# Patient Record
Sex: MALE | Race: OTHER | Employment: PART TIME | URBAN - NONMETROPOLITAN AREA
[De-identification: names, ages, dates, MRNs, and addresses within clinical notes are randomized per-mention and may not be internally consistent; named-entity substitution may affect disease eponyms.]

---

## 2024-04-22 ENCOUNTER — TELEPHONE (OUTPATIENT)
Dept: FAMILY MEDICINE CLINIC | Facility: CLINIC | Age: 54
End: 2024-04-22

## 2024-04-22 NOTE — TELEPHONE ENCOUNTER
# [id number] assisted in the call with patient.  I relayed the message to patient about getting his blood work completed before Doctor can refill medication.  Patient stated that he will be in tomorrow morning to get blood work done. I stated to stop into the office and we can send message over to refill medication.

## 2024-04-30 ENCOUNTER — TELEPHONE (OUTPATIENT)
Dept: FAMILY MEDICINE CLINIC | Facility: CLINIC | Age: 54
End: 2024-04-30

## 2024-04-30 NOTE — TELEPHONE ENCOUNTER
----- Message from Hortencia Zarate MD sent at 4/25/2024  4:50 PM EDT -----  Hi Ladies,    Can we please call patient and inform him of the below:    After review of your lab work, overall things look stable. Blood counts, kidney, liver and thyroid function are normal. Cholesterol levels were stable.  Hemoglobin A1c at 8.6 indicating poor control of his diabetes at this time. Lastly, please note that there is some protein in his urine at this time, also indicating we could better control his Type 2 DM. Please let me know if you have any questions or concerns.    Best,    ____________________________________________________________________

## 2024-04-30 NOTE — TELEPHONE ENCOUNTER
[id number]:   Called patient to relay test results below. Patient is aware and will keep his appointment on 5/8/24.

## 2024-05-08 ENCOUNTER — OFFICE VISIT (OUTPATIENT)
Dept: FAMILY MEDICINE CLINIC | Facility: CLINIC | Age: 54
End: 2024-05-08
Payer: COMMERCIAL

## 2024-05-08 VITALS
WEIGHT: 171.6 LBS | TEMPERATURE: 95.8 F | DIASTOLIC BLOOD PRESSURE: 62 MMHG | OXYGEN SATURATION: 95 % | BODY MASS INDEX: 27.58 KG/M2 | HEART RATE: 83 BPM | HEIGHT: 66 IN | SYSTOLIC BLOOD PRESSURE: 110 MMHG

## 2024-05-08 DIAGNOSIS — L80 VITILIGO: ICD-10-CM

## 2024-05-08 DIAGNOSIS — Z00.00 ANNUAL PHYSICAL EXAM: Primary | ICD-10-CM

## 2024-05-08 DIAGNOSIS — Z12.5 SCREENING FOR PROSTATE CANCER: ICD-10-CM

## 2024-05-08 DIAGNOSIS — Z21 ASYMPTOMATIC HIV INFECTION (HCC): ICD-10-CM

## 2024-05-08 DIAGNOSIS — E11.9 TYPE 2 DIABETES MELLITUS WITHOUT COMPLICATION, WITHOUT LONG-TERM CURRENT USE OF INSULIN (HCC): ICD-10-CM

## 2024-05-08 DIAGNOSIS — E78.2 MIXED HYPERLIPIDEMIA: ICD-10-CM

## 2024-05-08 PROCEDURE — 99396 PREV VISIT EST AGE 40-64: CPT | Performed by: FAMILY MEDICINE

## 2024-05-08 RX ORDER — DAPAGLIFLOZIN 5 MG/1
5 TABLET, FILM COATED ORAL DAILY
Qty: 30 TABLET | Refills: 5 | Status: SHIPPED | OUTPATIENT
Start: 2024-05-08 | End: 2024-05-14 | Stop reason: CLARIF

## 2024-05-08 RX ORDER — ATORVASTATIN CALCIUM 20 MG/1
20 TABLET, FILM COATED ORAL DAILY
Qty: 90 TABLET | Refills: 1 | Status: SHIPPED | OUTPATIENT
Start: 2024-05-08

## 2024-05-08 NOTE — ASSESSMENT & PLAN NOTE
Patient currently follows with Infectious Disease (Dr. Conway) for HIV and is maintained on Juluca daily.  - continue f/u with ID for medications and HIV lab works, including Hep C

## 2024-05-08 NOTE — PROGRESS NOTES
ADULT ANNUAL PHYSICAL  [facility] PRIMARY CARE    NAME: Cuauhtemoc Bennett  AGE: 54 y.o. SEX: male  : 1970     DATE: 2024     Assessment and Plan:     Problem List Items Addressed This Visit       HLD (hyperlipidemia)     Last lipid profile on 2024 wnl  - continue lipitor 20mg daily  - encourage resuming exercises         Relevant Medications    atorvastatin (LIPITOR) 20 mg tablet    Vitiligo     Patient f/u with dermatologist in [place] and is currently using Tacrolimus 1% cream, sunscreen, bergamota 10% essential oil for the skin but feels like he's getting more white pigmentations  - referral to derm provided         Relevant Orders    Ambulatory Referral to Dermatology    Asymptomatic HIV infection (HCC)     Patient currently follows with Infectious Disease (Dr. Conway) for HIV and is maintained on Juluca daily.  - continue f/u with ID for medications and HIV lab works, including Hep C         Type 2 diabetes mellitus without complication, without long-term current use of insulin ([facility])       Lab Results   Component Value Date    HGBA1C 8.6 (H) 2024     Patient last HgbA1c was many years ago. He reported he stopped exercising a while ago and has been eating diet with high carbs. Currently taking metformin 1000mg daily. Denied hypoglycemia episode, neuropathy.  Recent lab shows mild proteinuria.  - continue metformin 1000mg daily  - add Farxiga 5mg daily  - f/u with HgA1c in 3 mo         Relevant Medications    metFORMIN (GLUCOPHAGE) 1000 MG tablet    dapagliflozin 5 MG TABS     Other Visit Diagnoses       Annual physical exam    -  Primary    no acute complaints, pt needs new derm referral for vitiligo f/u. Completed basic lab works  - ordered PSA   - referral to GI for colonoscopy and Derm for vitiligo given  - refill lipitor, metformin  - prescribed farxiga 5mg for DM2, proteinuria   - performed diabetic foot exam, wnl    Relevant  Orders    Ambulatory Referral to Gastroenterology    Screening for prostate cancer        pt recall last PSA check many years ago, would like to check again  - ordered PSA    Relevant Orders    PSA, Total Screen              Immunizations and preventive care screenings were discussed with patient today. Appropriate education was printed on patient's after visit summary.    Discussed risks and benefits of prostate cancer screening. We discussed the controversial history of PSA screening for prostate cancer in the [place] as well as the risk of over detection and over treatment of prostate cancer by way of PSA screening.  The patient understands that PSA blood testing is an imperfect way to screen for prostate cancer and that elevated PSA levels in the blood may also be caused by infection, inflammation, prostatic trauma or manipulation, urological procedures, or by benign prostatic enlargement.    The role of the digital rectal examination in prostate cancer screening was also discussed and I discussed with him that there is large interobserver variability in the findings of digital rectal examination.    Counseling:  Dental Health: discussed importance of regular tooth brushing, flossing, and dental visits.  Exercise: the importance of regular exercise/physical activity was discussed. Recommend exercise 3-5 times per week for at least 30 minutes.   Diet: discussed with patient the importance of avoiding processed food and sugary drinks in diet. Considering eating more vegetables. Eat white meat instead of red meat. Cutting carbs in diet like bread, rice, noodles.         Return in about 3 months (around 8/8/2024) for f/u A1c.     Chief Complaint:     Chief Complaint   Patient presents with    Physical Exam     Dermatology referral-white spots on skin      History of Present Illness:     Adult Annual Physical   Cuauhtemoc Bennett is a 55 yo male with PMH HLD, DM2, HIV, and vitiligo presents for a comprehensive  physical exam. The patient reports he is getting more vitiligo white pigmentations. He f/u with derm in [facility] for the condition and has been using sunscreen, tacrolimus 1% cream, and bergamota with minimal help. Patient f/u with ID for HIV lab works and is currently taking Juluca daily. He is also taking metformin 1000mg daily and denied new neuropathy or hypoglycemia episodes. Patient is aware his recent lab work shows elevated A1c 8.6 and mild proteinuria. He admitted not exercising enough, and his diet is consists of high carbs. Patient denied headache, sweating, dizziness, N/V, CP, SOB, abdominal pain, dysuria, hematuria, changes with bowel movement, eye pain or discharge.     Diet and Physical Activity  Diet/Nutrition: high carb diet.   Exercise: no formal exercise. Pt stopped exercising a while ago     Depression Screening  PHQ-2/9 Depression Screening    Little interest or pleasure in doing things: 0 - not at all  Feeling down, depressed, or hopeless: 0 - not at all  PHQ-2 Score: 0  PHQ-2 Interpretation: Negative depression screen       General Health  Sleep: gets 7-8 hours of sleep on average.   Hearing: normal - bilateral.  Vision:  wear glasses, will f/u with ophthalmologist to get new prescription .   Dental:  will f/u with dentist back in [facility] in a month .        Health  Symptoms include: none       Review of Systems:     Review of Systems   Constitutional:  Negative for chills and fever.   HENT:  Negative for ear pain, hearing loss, sore throat and tinnitus.    Eyes:  Negative for pain, discharge and visual disturbance.   Respiratory:  Negative for cough, shortness of breath and wheezing.    Cardiovascular:  Negative for chest pain, palpitations and leg swelling.   Gastrointestinal:  Negative for abdominal distention, abdominal pain, blood in stool, constipation, diarrhea, nausea and vomiting.   Genitourinary:  Negative for difficulty urinating, dysuria and hematuria.    Musculoskeletal:  Negative for back pain.   Skin:  Positive for color change. Negative for wound.        Chronic vitiligo white pigmentations on b/l extremities   Neurological:  Negative for dizziness and headaches.   Hematological:  Negative for adenopathy.   All other systems reviewed and are negative.     Past Medical History:     Past Medical History:   Diagnosis Date    Diabetes mellitus (HCC) 2008    HIV disease (HCC)       Past Surgical History:     Past Surgical History:   Procedure Laterality Date    FINGER SURGERY Right 1977      Family History:     Family History   Problem Relation Age of Onset    Diabetes Brother       Social History:     Social History     Socioeconomic History    Marital status: /Civil Union     Spouse name: None    Number of children: None    Years of education: None    Highest education level: None   Occupational History    None   Tobacco Use    Smoking status: Never     Passive exposure: Never    Smokeless tobacco: Never   Vaping Use    Vaping status: Never Used   Substance and Sexual Activity    Alcohol use: Not Currently     Comment: social    Drug use: Never    Sexual activity: None   Other Topics Concern    None   Social History Narrative    None     Social Determinants of Health     Financial Resource Strain: Not on file   Food Insecurity: Not on file   Transportation Needs: Not on file   Physical Activity: Not on file   Stress: Not on file   Social Connections: Not on file   Intimate Partner Violence: Not on file   Housing Stability: Not on file      Current Medications:     Current Outpatient Medications   Medication Sig Dispense Refill    atorvastatin (LIPITOR) 20 mg tablet Take 1 tablet (20 mg total) by mouth daily 90 tablet 1    dapagliflozin 5 MG TABS Take 1 tablet (5 mg total) by mouth daily 30 tablet 5    Juluca 50-25 MG TABS Take 1 tablet by mouth daily      metFORMIN (GLUCOPHAGE) 1000 MG tablet Take 1 tablet (1,000 mg total) by mouth 2 (two) times a day 180  "tablet 1     No current facility-administered medications for this visit.      Allergies:     No Known Allergies   Physical Exam:     /62   Pulse 83   Temp (!) 95.8 °F (35.4 °C)   Ht 5' 5.5\" (1.664 m)   Wt 77.8 kg (171 lb 9.6 oz)   SpO2 95%   BMI 28.12 kg/m²     Physical Exam  Vitals reviewed.   Constitutional:       General: He is not in acute distress.     Appearance: Normal appearance.   HENT:      Head: Normocephalic and atraumatic.      Right Ear: Tympanic membrane, ear canal and external ear normal. There is no impacted cerumen.      Left Ear: Tympanic membrane, ear canal and external ear normal. There is no impacted cerumen.      Nose: Nose normal. No congestion or rhinorrhea.      Mouth/Throat:      Mouth: Mucous membranes are moist.      Pharynx: Oropharynx is clear. No oropharyngeal exudate or posterior oropharyngeal erythema.   Eyes:      General:         Right eye: No discharge.      Extraocular Movements: Extraocular movements intact.      Conjunctiva/sclera: Conjunctivae normal.      Pupils: Pupils are equal, round, and reactive to light.   Cardiovascular:      Rate and Rhythm: Normal rate and regular rhythm.      Pulses: Normal pulses.           Dorsalis pedis pulses are 2+ on the right side and 2+ on the left side.        Posterior tibial pulses are 2+ on the right side and 2+ on the left side.      Heart sounds: Normal heart sounds. No murmur heard.  Pulmonary:      Effort: Pulmonary effort is normal. No respiratory distress.      Breath sounds: Normal breath sounds. No wheezing.   Abdominal:      General: Abdomen is flat. Bowel sounds are normal. There is no distension.      Palpations: Abdomen is soft.      Tenderness: There is no abdominal tenderness. There is no guarding or rebound.   Musculoskeletal:         General: Normal range of motion.      Cervical back: Normal range of motion and neck supple. No rigidity.      Right lower leg: No edema.      Left lower leg: No edema.   Feet: "      Right foot:      Skin integrity: No ulcer, skin breakdown, erythema, warmth, callus or dry skin.      Left foot:      Skin integrity: No ulcer, skin breakdown, erythema, warmth, callus or dry skin.   Skin:     General: Skin is warm.      Capillary Refill: Capillary refill takes less than 2 seconds.      Coloration: Skin is not jaundiced.      Findings: Rash present.      Comments: Multiple maculopapular white pigmentations/patches on b/l extremities   Neurological:      General: No focal deficit present.      Mental Status: He is alert and oriented to person, place, and time.      Motor: No weakness.   Psychiatric:         Mood and Affect: Mood normal.        Patient's shoes and socks removed.    Right Foot/Ankle   Right Foot Inspection  Skin Exam: skin normal and skin intact. No dry skin, no warmth, no callus, no erythema, no maceration, no abnormal color, no pre-ulcer, no ulcer and no callus.     Toe Exam: ROM and strength within normal limits.  no right toe deformity    Sensory   Vibration: intact  Proprioception: intact  Monofilament testing: intact    Vascular  Capillary refills: < 3 seconds  The right DP pulse is 2+. The right PT pulse is 2+.     Left Foot/Ankle  Left Foot Inspection  Skin Exam: skin normal and skin intact. No dry skin, no warmth, no erythema, no maceration, normal color, no pre-ulcer, no ulcer and no callus.     Toe Exam: ROM and strength within normal limits. No left toe deformity.     Sensory   Vibration: intact  Proprioception: intact  Monofilament testing: intact    Vascular  Capillary refills: < 3 seconds  The left DP pulse is 2+. The left PT pulse is 2+.     Assign Risk Category  No deformity present          Dionte Chakraborty DO  [facility] PRIMARY CARE

## 2024-05-08 NOTE — ASSESSMENT & PLAN NOTE
Patient f/u with dermatologist in [facility] and is currently using Tacrolimus 1% cream, sunscreen, bergamota 10% essential oil for the skin but feels like he's getting more white pigmentations  - referral to derm provided

## 2024-05-08 NOTE — ASSESSMENT & PLAN NOTE
Lab Results   Component Value Date    HGBA1C 8.6 (H) 04/23/2024     Patient last HgbA1c was many years ago. He reported he stopped exercising a while ago and has been eating diet with high carbs. Currently taking metformin 1000mg daily. Denied hypoglycemia episode, neuropathy.  Recent lab shows mild proteinuria.  - continue metformin 1000mg daily  - add Farxiga 5mg daily  - f/u with HgA1c in 3 mo

## 2024-05-13 ENCOUNTER — TELEPHONE (OUTPATIENT)
Dept: FAMILY MEDICINE CLINIC | Facility: CLINIC | Age: 54
End: 2024-05-13

## 2024-05-13 DIAGNOSIS — E11.9 TYPE 2 DIABETES MELLITUS WITHOUT COMPLICATION, WITHOUT LONG-TERM CURRENT USE OF INSULIN (HCC): Primary | ICD-10-CM

## 2024-05-13 NOTE — TELEPHONE ENCOUNTER
Called patient's insurance to do a prior auth for Farxiga 5 MG tablet. Patient's insurance will cover brand name not the generic. Also for a 90 day supply as well.     If new prescription could be sent over to [facility].

## 2024-05-14 RX ORDER — DAPAGLIFLOZIN 5 MG/1
5 TABLET, FILM COATED ORAL DAILY
Qty: 90 TABLET | Refills: 1 | Status: SHIPPED | OUTPATIENT
Start: 2024-05-14

## 2024-08-08 ENCOUNTER — TELEPHONE (OUTPATIENT)
Dept: ADMINISTRATIVE | Facility: OTHER | Age: 54
End: 2024-08-08

## 2024-08-08 NOTE — TELEPHONE ENCOUNTER
----- Message from Jahaira PEREZ sent at 8/8/2024  9:13 AM EDT -----  Regarding: Care Gap Request  08/08/24 9:13 AM    Hello, our patient attached above has had Diabetic Foot Exam completed/performed. Please assist in updating the patient chart by pulling the document from visit on 5/8/24 within Chart Review. The date of service is 5/8/24.     Thank you,  Jahaira Crane MA [facility]

## 2024-08-08 NOTE — TELEPHONE ENCOUNTER
Upon review of the In Basket request we have found/obtained the documentation. After careful review of the document we are unable to complete this request for Diabetic Foot Exam because the documentation does not have the proper verbiage (wording) needed to close the requested care gap(s).    Any additional questions or concerns should be emailed to the Practice Liaisons via the appropriate education email address, please do not reply via In Basket.    Thank you  Malinda Card MA   [facility]

## 2024-08-19 ENCOUNTER — TELEPHONE (OUTPATIENT)
Dept: FAMILY MEDICINE CLINIC | Facility: CLINIC | Age: 54
End: 2024-08-19

## 2024-08-19 NOTE — TELEPHONE ENCOUNTER
Please call patient and ensure we reschedule his appt and have him complete required blood work.     Thanks,        *Called pt to relay dr's response. He didn't answer and was unable to leave a voicemail.

## 2024-10-31 DIAGNOSIS — E11.9 TYPE 2 DIABETES MELLITUS WITHOUT COMPLICATION, WITHOUT LONG-TERM CURRENT USE OF INSULIN (HCC): ICD-10-CM

## 2024-10-31 NOTE — TELEPHONE ENCOUNTER
Patient needs updated blood work and has previously placed orders. Please contact patient to go for labs. Courtesy refill provided.     Pt needs A1C

## 2024-11-25 DIAGNOSIS — E78.2 MIXED HYPERLIPIDEMIA: ICD-10-CM

## 2024-11-26 RX ORDER — ATORVASTATIN CALCIUM 20 MG/1
20 TABLET, FILM COATED ORAL DAILY
Qty: 90 TABLET | Refills: 1 | Status: SHIPPED | OUTPATIENT
Start: 2024-11-26

## 2024-12-18 ENCOUNTER — OFFICE VISIT (OUTPATIENT)
Dept: FAMILY MEDICINE CLINIC | Facility: CLINIC | Age: 54
End: 2024-12-18

## 2024-12-18 VITALS
SYSTOLIC BLOOD PRESSURE: 106 MMHG | DIASTOLIC BLOOD PRESSURE: 68 MMHG | HEART RATE: 104 BPM | WEIGHT: 170.2 LBS | TEMPERATURE: 96.9 F | OXYGEN SATURATION: 98 % | BODY MASS INDEX: 27.89 KG/M2

## 2024-12-18 DIAGNOSIS — E11.9 TYPE 2 DIABETES MELLITUS WITHOUT COMPLICATION, WITHOUT LONG-TERM CURRENT USE OF INSULIN (HCC): Primary | ICD-10-CM

## 2024-12-18 DIAGNOSIS — E11.9 TYPE 2 DIABETES MELLITUS WITHOUT COMPLICATION, WITHOUT LONG-TERM CURRENT USE OF INSULIN (HCC): ICD-10-CM

## 2024-12-18 DIAGNOSIS — Z12.11 SCREEN FOR COLON CANCER: ICD-10-CM

## 2024-12-18 LAB — SL AMB POCT HEMOGLOBIN AIC: 8.9 (ref ?–6.5)

## 2024-12-18 RX ORDER — BLOOD-GLUCOSE METER
KIT MISCELLANEOUS
Qty: 1 KIT | Refills: 0 | Status: SHIPPED | OUTPATIENT
Start: 2024-12-18

## 2024-12-18 RX ORDER — BLOOD SUGAR DIAGNOSTIC
STRIP MISCELLANEOUS
Qty: 300 EACH | Refills: 3 | Status: SHIPPED | OUTPATIENT
Start: 2024-12-18

## 2024-12-18 RX ORDER — DAPAGLIFLOZIN 5 MG/1
5 TABLET, FILM COATED ORAL DAILY
Qty: 90 TABLET | Refills: 1 | Status: SHIPPED | OUTPATIENT
Start: 2024-12-18

## 2024-12-18 RX ORDER — LISINOPRIL 5 MG/1
5 TABLET ORAL DAILY
Qty: 90 TABLET | Refills: 1 | Status: SHIPPED | OUTPATIENT
Start: 2024-12-18

## 2024-12-18 RX ORDER — LANCETS 33 GAUGE
EACH MISCELLANEOUS
Qty: 300 EACH | Refills: 3 | Status: SHIPPED | OUTPATIENT
Start: 2024-12-18

## 2024-12-18 NOTE — PATIENT INSTRUCTIONS
Patient Education     Diabetes tipo 2   Conceptos Básicos   Redactado por los médicos y editores de UpToDate   ¿Qué es la diabetes tipo 2? -- La diabetes tipo 2 es un trastorno que afecta la forma en que el cuerpo utiliza el azúcar. A veces se lo llama diabetes mellitus tipo 2.  Todas las células del cuerpo necesitan azúcar para funcionar normalmente. El azúcar entra en las células con la ayuda de martha hormona llamada insulina. La insulina se produce en el páncreas, un órgano ubicado en el área del estómago. Si no hay suficiente insulina o si el cuerpo hallie de responder a la insulina, el azúcar se acumula en la yandel. [place] es lo que les sucede a las personas con diabetes.  Existen dos tipos de diabetes:   Diabetes tipo 1 - En la diabetes tipo 1, el páncreas no produce insulina o produce muy poca.   Diabetes tipo 2 - En la mayoría de los casos de diabetes tipo 2, el cuerpo hallie de responder a la insulina normalmente. Con el tiempo, el páncreas hallie de producir suficiente insulina.  Tener exceso de peso corporal u obesidad aumenta el riesgo de desarrollar diabetes tipo 2. Sin embargo, las personas que no tienen exceso de peso corporal también pueden desarrollar diabetes.  ¿Cuáles son los síntomas de la diabetes tipo 2? -- En general, la diabetes tipo 2 no provoca síntomas. Cuando aparecen síntomas, son los siguientes, entre otros:   Necesidad de orinar con frecuencia   Sed intensa   Visión borrosa  ¿La diabetes puede ocasionar otros problemas de rahul? -- Sí. Es posible que la diabetes tipo 2 no le cause malestar, yonny si no la controla, con el tiempo puede ocasionar problemas graves, por ejemplo:   Infartos   Accidentes cerebrovasculares (derrames)   Enfermedad renal   Problemas de visión (o incluso ceguera)   Dolor o pérdida de sensibilidad en las chinyere y los pies   Necesidad de cortar (amputar) los dedos de las chinyere, los dedos de los pies u otras partes del cuerpo  ¿Cómo puedo saber si tengo diabetes tipo  "2? -- Para averiguar si tiene diabetes tipo 2, el médico o enfermero puede realizar martha prueba de yandel. Existen dos pruebas que pueden usarse con mili fin. Ambas consisten en medir la cantidad de azúcar en la yandel, llamada \"azúcar en yandel\" o \"glucosa en yandel\":   Martha de las pruebas mide el azúcar en yandel en el momento en que se extrae la muestra. Esta prueba se realiza por la mañana. No podrá comer ni beber nada sinan agua tatiana un mínimo de 8 horas antes de la prueba.   La otra prueba indica el promedio de azúcar en yandel de los últimos 2 a 3 meses. Esta prueba de yandel se llama \"hemoglobina A1C\" o simplemente \"A1C\". Se puede revisar en cualquier momento del día, incluso si ha comido recientemente.  ¿Cómo se trata la diabetes tipo 2? -- Las metas del tratamiento son manejar el azúcar en yandel y reducir el riesgo de problemas futuros que pueden desarrollar las personas que tienen diabetes.  El tratamiento podría incluir:   Cambios en el estilo de alexys - Es un aspecto importante del manejo de la diabetes. Incluye comer alimentos saludables y hacer suficiente actividad física.   Medicinas - Existen algunas medicinas que ayudan a disminuir el azúcar en yandel. Algunas personas deben virgil píldoras que permiten que el organismo genere más insulina o que posibilitan que la insulina cumpla con lemus función; otras deben aplicarse inyecciones de insulina.  Según las medicinas que tome, es posible que tenga que revisarse el azúcar en yandel periódicamente en lemus hogar, yonny no todas las personas que tienen diabetes tipo 2 necesitan hacerlo. El médico o enfermero le dirá si debe revisarse el azúcar en yandel, y cuándo y cómo hacerlo.  A veces, las personas con diabetes tipo 2 también deben virgil medicinas para ayudar a prevenir los problemas que causa la enfermedad. Por ejemplo, las medicinas que se usan para bajar la presión arterial pueden disminuir las posibilidades de sufrir un infarto o un accidente " cerebrovascular (derrame).   Atención médica general - También es importante cuidar otros aspectos de lemus rahul. [place] incluye vigilar lemus presión arterial y farhad niveles de colesterol. También debe recibir ciertas vacunas, miguel angel las vacunas para protegerse de la gripe y de la enfermedad por coronavirus 2019 (“COVID-19”). Algunas personas también necesitan martha vacuna para prevenir la neumonía.  ¿La diabetes tipo 2 se puede prevenir? -- Sí. Para reducir farhad posibilidades de desarrollar diabetes tipo 2, lo más importante que puede hacer es tener martha alimentación saludable y realizar mucha actividad física. [place] puede ayudarlo a bajar de peso, si tiene sobrepeso. Sin embargo, comer [place] y hacer actividad también es glover para lemus rahul general. Incluso la actividad leve, miguel angel caminar, tiene beneficios.  Si fuma, dejar de fumar también puede reducir lemus riesgo de desarrollar diabetes tipo 2. Dejar de fumar puede ser difícil, yonny lemus médico o enfermero puede ayudar.  Todos los artículos se actualizan a medida que se descubre nueva evidencia y culmina nuestro proceso de evaluación por homólogos   Angeline artículo se recuperó de UpToDate el: Apr 24, 2024.  Artículo 18132 Versión [id number].[id number]  Release: 32.3.2 - C32.113  © 2024 UpToDate, Inc. Todos los derechos reservados.  Exención de responsabilidad y uso de la información del consumidor   Descargo de responsabilidad: esta información generalizada es un resumen limitado de información sobre el diagnóstico, el tratamiento y/o los medicamentos. No pretende ser exhaustiva y se debe utilizar miguel angel herramienta para ayudar al usuario a comprender y/o evaluar las posibles opciones de diagnóstico y tratamiento. No incluye toda la información sobre afecciones, tratamientos, medicamentos, efectos secundarios o riesgos puedan ser aplicables a un paciente específico. No tiene el propósito de servir miguel angel recomendación médica ni de sustituir la recomendación médica, el diagnóstico o el  "tratamiento de un profesional de atención médica que se base en el examen y la evaluación de mili profesional de la rahul respecto a las circunstancias específicas y únicas del paciente. Los pacientes deben hablar con un profesional de atención médica para obtener información completa sobre lemus rahul, cuestiones médicas y opciones de tratamiento, incluidos los riesgos o los beneficios relacionados con el uso de medicamentos. Esta información no certifica que los tratamientos o medicamentos diana seguros, eficaces o estén aprobados para tratar a un paciente específico. [employer] y farhad afiliados renuncian a cualquier garantía o responsabilidad relacionada con esta información o el uso de la misma.El uso de esta información está sujeto a las Condiciones de uso, disponibles en https://www.BlogCNer.com/en/know/clinical-effectiveness-terms. 2024© [employer]. y farhad afiliados y/o licenciantes. Todos los derechos reservados.  Copyright   © 2024 [employer] Todos los derechos reservados.    Patient Education     Cuidado de los pies para personas con diabetes   Conceptos Básicos   Redactado por los médicos y editores de UpToDate   ¿Por qué el cuidado de los pies es importante si tengo diabetes? -- La diabetes puede producir daño en los nervios si el nivel de azúcar en yandel está alto tatiana mucho tiempo. El término médico es \"neuropatía diabética\".  Si tiene problemas en los nervios en los pies, es posible que no pueda sentir dolor en el pie. Normalmente, las personas sienten dolor cuando tienen un dexter o martha ampolla en un pie. El dolor les indica que deben tratar el dexter para que sane, yonny las personas con daño en los nervios podrían no sentir dolor cuando se lastiman los pies. Incluso es posible que no sepan que tienen un dexter, por lo que podrían dejarlo sin tratar. Los problemas que no se tratan de inmediato pueden empeorar mucho. Por ejemplo, un dexter sin tratar se puede infectar y convertirse en martha " llaga abierta.  El nivel alto de azúcar en yandel también puede dañar los vasos sanguíneos y disminuir el flujo sanguíneo a los pies. [place] puede debilitar la piel y hacer que las heridas tarden más en sanar. También es más probable que se infecte si lemus nivel de azúcar en yandel es alto.  ¿Cómo cuido de mis pies? -- Cuidar [place] de farhad pies puede ayudar a prevenir problemas en los pies. Debe hacer lo siguiente:   Lávese los pies todos los donnie con agua tibia y jabón. Séquese los pies con golpecitos suaves, y asegúrese de secar la piel que está entre los dedos.   Mantenga los pies hidratados. Aplique loción en la parte de arriba y de abajo de los pies, yonny no entre los dedos.   Revísese los pies todos los donnie (figura 1). Dunia si tiene mc, ampollas, enrojecimiento o inflamación. Use un sherin o pídale ayuda a alguien para revisar la parte inferior de los pies. Revise todas las partes del pie, especialmente entre los dedos. Dunia si tiene piel agrietada, úlceras, ampollas o enrojecimiento.   Córtese las uñas del pie rectas cuando sea necesario (figura 2). No dexter las esquinas de las uñas de los pies. Lime los bordes ásperos. No se dexter las cutículas. Pida ayuda si no ve [place] o si no puede inclinarse lo suficiente para revisarse los pies.   Pídale al médico o enfermero que le revise los pies en cada consulta. Quítese los zapatos y los calcetines en esos exámenes.   Consulte a un especialista en cuidado de los pies (miguel angel un podólogo) si tiene martha uña encarnada, un kamar de mendez o un callo. No intente quitarse los ojos de mendez y los callos usted mismo.  ¿Cómo protejo mis pies de lesiones? -- Existen varias maneras de protegerse los pies. Puede hacer lo siguiente:   Use zapatos y calcetines en todo momento, incluso dentro de lemus casa. No camine descalzo. Póngase zapatos de baño si va a la playa o a martha piscina.   Elija calzado que se ajuste [place] a lemus pie. No debe quedarle demasiado suelto ni demasiado apretado. El  calzado debe tener espacio suficiente para los dedos (figura 3). Es posible que lemus médico le recete zapatos especiales. [place] si lemus seguro los cubre.   Revise farhad zapatos cada vez antes de ponérselos para asegurarse de que la plantilla no esté doblada. Además, verifique que no haya nada dentro de los zapatos antes de ponérselos.   No use zapatos que dejen al descubierto ninguna parte del pie, miguel angel sandalias, chanclas o zuecos.   Use calcetines de algodón que no le queden demasiado apretados. No use calzado sin calcetines.   Proteja farhad pies del calor y del frío. Pruebe el agua del baño antes de meter los pies para asegurarse de que no esté demasiado caliente. No camine descalzo sobre suelo caliente. Tenga especial cuidado al salir cuando hace frío y use calcetines abrigados.  ¿Qué más jami saber? -- Puede disminuir el riesgo de tener problemas en los pies si mantiene lemus nivel de azúcar en yandel lo más cerca posible de farhad niveles objetivo. También puede hacer lo siguiente:   Mueva los tobillos y los dedos de los pies con frecuencia para ayudar con la circulación de la yandel. Puede usar medias elásticas para que lo ayuden con la hinchazón.   Camine a menudo. Caminar regularmente ayuda con la circulación de la yandel.   Si fuma, trate de dejar el hábito. Lemus médico o enfermero puede ayudar. Fumar hace que la yandel no circule tan [place] a los pies y puede dañar farhad nervios.  ¿Cuándo jami llamar al médico? -- Llame a lemus médico o enfermero para solicitar asesoramiento si tiene:   Fiebre de 100.4 °F (38 °C) o superior, escalofríos o martha herida que no ailin   Inflamación, enrojecimiento, calor en la jennifer de martha herida, olor desagradable que proviene de martha herida o secreción amarillenta, verdosa o con yandel   Llagas o ampollas en los pies que duelen más o menos de lo que cabría esperar   Adormecimiento u hormigueo en un pie o martha pierna   Ojos de mendez, callos, ampollas o llagas nuevas en el pie   Piel muy seca,  escamosa o agrietada en los pies   Cambios en el aspecto de las articulaciones o del arco del pie  Todos los artículos se actualizan a medida que se descubre nueva evidencia y culmina nuestro proceso de evaluación por homólogos   Angeline artículo se recuperó de UpToDate el: Mar 13, 2024.  Artículo [id number] Versión [id number]  Release: 32.2.4 - C32.71  © 2024 UpToDate, Inc. Todos los derechos reservados.  figura 1: Revisión de los pies en personas con diabetes     Las personas con diabetes deben revisarse ambos pies todos los donnie. Es importante revisarse todo el pie, incluso entre los dedos. Si no puede verse la planta de los pies, use un sherin o pídale a otra persona que lo revise. Informe a lemus médico o enfermero si encuentra:  Enrojecimiento   Valladares o grietas en la piel   Ampollas   Inflamación   Gráfico 27736 Versión 3.0  figura 2: Córtese las uñas de los pies     Córtese las uñas de los pies rectas y merrick los bordes con martha lima de uñas.  Gráfico [id number] Versión 2.0  figura 3: Forma correcta del calzado     Elija calzado que le calce [place] y que no sea demasiado ajustado ni demasiado suelto. El calzado debe tener espacio suficiente para los dedos.  Gráfico 35656 Versión 2.0  Exención de responsabilidad y uso de la información del consumidor   Descargo de responsabilidad: esta información generalizada es un resumen limitado de información sobre el diagnóstico, el tratamiento y/o los medicamentos. No pretende ser exhaustiva y se debe utilizar miguel angel herramienta para ayudar al usuario a comprender y/o evaluar las posibles opciones de diagnóstico y tratamiento. No incluye toda la información sobre afecciones, tratamientos, medicamentos, efectos secundarios o riesgos puedan ser aplicables a un paciente específico. No tiene el propósito de servir miguel angel recomendación médica ni de sustituir la recomendación médica, el diagnóstico o el tratamiento de un profesional de atención médica que se base en el examen y la evaluación de  "mili profesional de la rahul respecto a las circunstancias específicas y únicas del paciente. Los pacientes deben hablar con un profesional de atención médica para obtener información completa sobre lemus rahul, cuestiones médicas y opciones de tratamiento, incluidos los riesgos o los beneficios relacionados con el uso de medicamentos. Esta información no certifica que los tratamientos o medicamentos diana seguros, eficaces o estén aprobados para tratar a un paciente específico. [employer] y farhad afiliados renuncian a cualquier garantía o responsabilidad relacionada con esta información o el uso de la misma.El uso de esta información está sujeto a las Condiciones de uso, disponibles en https://www.Ziltaer.com/en/know/clinical-effectiveness-terms. 2024© UpToDate, Inc. y farhad afiliados y/o licenciantes. Todos los derechos reservados.  Copyright   © 2024 UpToDate, Inc. Todos los derechos reservados.    Patient Education     Nivel bajo de azúcar en yandel en personas con diabetes   Conceptos Básicos   Redactado por los médicos y editores de UpToDate   ¿Qué es el nivel bajo de azúcar en yandel? -- El nivel bajo de azúcar en yandel es un padecimiento cuyos síntomas van desde sudoración y hambre hasta el desmayo. Se lo conoce también miguel angel “hipoglucemia” y se produce cuando el nivel de azúcar en yandel disminuye demasiado.  [place] puede ocurrir en personas con diabetes (a veces llamada \"diabetes mellitus\") que usan ciertas medicinas para yahir enfermedad, incluida la insulina y algunos tipos de píldoras.  ¿En qué ocasiones las personas con diabetes pueden tener bajo el nivel de azúcar en yandel? -- Las personas con diabetes pueden tener bajo el nivel de azúcar en yandel cuando:   Usan demasiada medicina, incluida la insulina o ciertas píldoras para la diabetes   No comen lo suficiente   Hacen demasiado ejercicio sin comer un refrigerio o reducir lemus dosis de insulina   Esperan demasiado entre comidas   Trish demasiado " alcohol  ¿Cuáles son los síntomas del nivel bajo de azúcar en yandel? -- Los síntomas del nivel bajo de azúcar en yandel pueden ser distintos en cada persona y cambiar con el tiempo. Tatiana las primeras etapas, martha persona puede:   Sudar o temblar   Sentir hambre   Sentirse preocupada  Quienes tienen síntomas tempranos deben revisar lemus nivel de azúcar en yandel para ruperto si es bajo y si necesitan tratamiento. Si los niveles bajos de azúcar en yandel no se tratan, pueden ocasionar síntomas graves. Por ejemplo:   Dificultad para caminar o debilidad   Dificultad para ruperto claramente   Confusión o comportamientos extraños   Desmayo o crisis neurológica  Algunas personas no sienten síntomas en las primeras etapas del nivel bajo de azúcar en yandel, lo que los médicos llaman “hipoglucemia asintomática”. Las personas con hipoglucemia asintomática tienen más probabilidades de tener síntomas graves porque devonte vez no saben que tienen bajo el nivel de azúcar en yandel hasta que sufren síntomas graves. La hipoglucemia asintomática a menudo aparece en personas que:   Sanders tenido diabetes tipo 1 tatiana más de 5 a 10 años   Usan insulina para mantener lemus nivel de azúcar en yandel bajo control   Sufren cansancio   Anne mucho alcohol   Usan ciertos medicamentos para la presión arterial michael o la diabetes  ¿Cómo se trata el nivel bajo de azúcar en yandel? -- El nivel bajo de azúcar en yandel se puede tratar con:   Loreod rápidas de azúcar - Las personas pueden tratar lemus nivel bajo de azúcar en yandel si anne o comen loredo rápidas de azúcar (tabla 1). Los alimentos que tienen grasa, miguel angel el chocolate o el queso, no tratan el nivel bajo de azúcar en yandel tan rápido. Usted y un miembro de lemus nataly deben llevar consigo martha florecita rápida de azúcar en todo momento.   Martha dosis de glucagón - El glucagón es martha hormona que puede subir rápidamente los niveles de azúcar en yandel y detener los síntomas graves. Viene en forma de  inyección (figura 1) o de spray nasal. Si lemus médico le recomienda que lleve con usted glucagón, le dirá cuándo y cómo usarlo. Si es posible, también es buena idea que un familiar, amigo o persona con la que vive aprenda a inyectarle glucagón, así yahir persona podrá inyectárselo en mikala de que usted no pueda hacerlo por lemus cuenta.  ¿Qué jami hacer después del tratamiento? -- Después del tratamiento para el nivel bajo de azúcar en yandel, la mayoría de la gente puede volver a lemus rutina habitual, yonny lemus médico o enfermero podría recomendarle que revise lemus nivel de azúcar en yandel con mayor frecuencia tatiana los 2 a 3 días siguientes.  Si le rolon tratado el nivel bajo de azúcar en yandel con glucagón, llame a lemus médico o enfermero. Es posible que le cambie la dosis de lemus medicina para la diabetes.  ¿Cómo puedo prevenir el nivel bajo de azúcar en yandel? -- La mejor forma de prevenirlo es:   Revisar a menudo farhad niveles de azúcar en yandel - Lemus médico o enfermero le dirá cómo y cuándo revisar farhad niveles en lemus hogar. También le dirá cuáles deberían ser farhad niveles de azúcar en yandel y cuándo tratar el nivel bajo de azúcar en yandel.   Saber cuáles son los síntomas del nivel bajo de azúcar en yandel y estar preparado para tratarlo en las primeras etapas, ya que si lo trata en forma temprana puede prevenir síntomas graves.  ¿Cuándo jami ir al hospital o pedir martha ambulancia? -- Un familiar o amigo debe llevarlo al hospital o pida martha ambulancia (en [place] llame al 9-1-1) si:   Sigue confundido 15 minutos después de recibir tratamiento con martha dosis de glucagón   Se ha desmayado y no tiene glucagón cerca   Sigue teniendo un nivel bajo de azúcar en yandel después del tratamiento  Si tiene un nivel bajo de azúcar en yandel no intente conducir hasta el hospital por farhad propios medios, ya que conducir con un nivel bajo de azúcar en yandel puede ser peligroso.  Todos los artículos se actualizan a medida que se  descubre nueva evidencia y culmina nuestro proceso de evaluación por homólogos   Angeline artículo se recuperó de UpToDate el: Apr 11, 2024.  Artículo [id number] Versión [id number].es-419.1  Release: 32.3.2 - C32.100  © 2024 [employer], Inc. Todos los derechos reservados.  tabla 1: Loredo rápidas de azúcar para tratar el nivel bajo de azúcar en yandel  3 o 4 tabletas de glucosa   ½ taza de jugo o gaseosa normal (no del tipo sin azúcar)   2 cucharadas de uvas pasas   4 o 5 galletas de sal   1 cucharada de azúcar   1 cucharada de miel o jarabe de maíz   6 a 8 caramelos duros   Estas loredo de azúcar actúan rápidamente para tratar los niveles bajos de azúcar en yandel. Las personas con diabetes que utilizan insulina o ciertas otras medicinas para la diabetes deben llevar consigo, en todo momento, al menos karlos de estos elementos.  Gráfico [id number] Versión 4.0  figura 1: Autoinyector de glucagón     Algunas personas llevan consigo un autoinyector de glucagón con forma de bolígrafo que permite inyectar dosis de la medicina fácilmente en la parte superior del brazo, en el muslo o en el área del estómago.  Gráfico [id number] Versión 2.0  Exención de responsabilidad y uso de la información del consumidor   Descargo de responsabilidad: esta información generalizada es un resumen limitado de información sobre el diagnóstico, el tratamiento y/o los medicamentos. No pretende ser exhaustiva y se debe utilizar miguel angel herramienta para ayudar al usuario a comprender y/o evaluar las posibles opciones de diagnóstico y tratamiento. No incluye toda la información sobre afecciones, tratamientos, medicamentos, efectos secundarios o riesgos puedan ser aplicables a un paciente específico. No tiene el propósito de servir miguel angel recomendación médica ni de sustituir la recomendación médica, el diagnóstico o el tratamiento de un profesional de atención médica que se base en el examen y la evaluación de angeline profesional de la rahul respecto a las circunstancias  específicas y únicas del paciente. Los pacientes deben hablar con un profesional de atención médica para obtener información completa sobre lemus rahul, cuestiones médicas y opciones de tratamiento, incluidos los riesgos o los beneficios relacionados con el uso de medicamentos. Esta información no certifica que los tratamientos o medicamentos diana seguros, eficaces o estén aprobados para tratar a un paciente específico. [employer] y farhad afiliados renuncian a cualquier garantía o responsabilidad relacionada con esta información o el uso de la misma.El uso de esta información está sujeto a las Condiciones de uso, disponibles en [email or web address]/en/know/clinical-effectiveness-terms. 2024© [employer] Inc. y farhad afiliados y/o licenciantes. Todos los derechos reservados.  Copyright   © 2024 [employer]. Todos los derechos reservados.

## 2024-12-18 NOTE — ASSESSMENT & PLAN NOTE
A1C increased from 8.6 to now 8.9 in office.   Pt had not been taking Farxiga as prescribed and only taken for 1 month in May  Advised to take Farxiga and continued Metformin 1000 mg BID; pt is aware that if A1C increases further, we will discuss need for starting insulin  Will begin Lisinopril 5 mg QD for nephro protection. ACR slightly elevated from April labs  Advised treadmill at home for 10-15 min for 3 times a week and decrease in starchy veggies (potatoes) and increase in green veggies  I made a referral for diabetes nutritionist to which patient was amenable to  Patient states that he has not been taking his statin medications as he felt that he did not have high cholesterol.  I advised patient that due to his diabetes, he should be on statin, but at this time we will defer this discussion for next visit as I did not want to begin multiple medications at the same time.  Today we are restarting his Farxiga and adding lisinopril for nephro protection.  Advised pt that if he were to experience lightheadness/dizziness, or acute cough, or if his BP at home drops <100 or <65 to call office and Lisinopril will be stopped/changed  Lab Results   Component Value Date    HGBA1C 8.9 (A) 12/18/2024       Orders:    POCT hemoglobin A1c    Blood Glucose Monitoring Suppl (OneTouch Verio Reflect) w/Device KIT; Check blood sugars three times daily. Please substitute with appropriate alternative as covered by patient's insurance. Dx: E11.65    glucose blood (OneTouch Verio) test strip; Check blood sugars three times daily. Please substitute with appropriate alternative as covered by patient's insurance. Dx: E11.65    OneTouch Delica Lancets 33G MISC; Check blood sugars three times daily. Please substitute with appropriate alternative as covered by patient's insurance. Dx: E11.65    Ambulatory referral to Diabetic Education - use to refer for diabetes group classes, individual diabetes education, medical nutrition therapy,  device training; Future    lisinopril (ZESTRIL) 5 mg tablet; Take 1 tablet (5 mg total) by mouth daily

## 2024-12-18 NOTE — PROGRESS NOTES
Name: Cuauhtemoc Bennett      : 1970      MRN: [id number]  Encounter Provider: Serafin Lane MD  Encounter Date: 2024   Encounter department: [facility]  :  Assessment & Plan  Type 2 diabetes mellitus without complication, without long-term current use of insulin (HCC)  A1C increased from 8.6 to now 8.9 in office.   Pt had not been taking Farxiga as prescribed and only taken for 1 month in May  Advised to take Farxiga and continued Metformin 1000 mg BID; pt is aware that if A1C increases further, we will discuss need for starting insulin  Will begin Lisinopril 5 mg QD for nephro protection. ACR slightly elevated from April labs  Advised treadmill at home for 10-15 min for 3 times a week and decrease in starchy veggies (potatoes) and increase in green veggies  I made a referral for diabetes nutritionist to which patient was amenable to  Patient states that he has not been taking his statin medications as he felt that he did not have high cholesterol.  I advised patient that due to his diabetes, he should be on statin, but at this time we will defer this discussion for next visit as I did not want to begin multiple medications at the same time.  Today we are restarting his Farxiga and adding lisinopril for nephro protection.  Advised pt that if he were to experience lightheadness/dizziness, or acute cough, or if his BP at home drops <100 or <65 to call office and Lisinopril will be stopped/changed  Lab Results   Component Value Date    HGBA1C 8.9 (A) 2024       Orders:    POCT hemoglobin A1c    Blood Glucose Monitoring Suppl (OneTouch Verio Reflect) w/Device KIT; Check blood sugars three times daily. Please substitute with appropriate alternative as covered by patient's insurance. Dx: E11.65    glucose blood (OneTouch Verio) test strip; Check blood sugars three times daily. Please substitute with appropriate alternative as covered by patient's insurance. Dx: E11.65     OneTouch Delica Lancets 33G MISC; Check blood sugars three times daily. Please substitute with appropriate alternative as covered by patient's insurance. Dx: E11.65    Ambulatory referral to Diabetic Education - use to refer for diabetes group classes, individual diabetes education, medical nutrition therapy, device training; Future    lisinopril (ZESTRIL) 5 mg tablet; Take 1 tablet (5 mg total) by mouth daily    Screen for colon cancer  Patient states he had a colonoscopy in the [place] 1 year ago but does not have the records  Patient is amenable to doing Cologuard at this time  Orders:    Cologuard           History of Present Illness     Patient presents to the clinic today for a diabetes recheck.  Patient is taking his metformin 1000 mg BID. Pt has not had Farxiga 5 mg qd for 3-4 months as he thought it was for sleep and has not been taking it. He only took it for 1 month and did not have any SE with it, but does not know why he didn't continue. Advised pt to check at home if he has any, and if not, call the office and we will refill.  A1C in office is 8.9, which is up from 8.6 back in May.  1 pound weight loss since last visit in May.  Will start Lisinopril 5 mg for kidney protection. ACR slightly elevated.     Diet: home cooked always. Does endorse lot of rice intake. No soda or juice. Advised decrease in potatoes and transition to green veggies.   Exercise: pt has treadmill at home. Recommended 10-15 min/day 3 times a week and increase as able.     Of note, pt follows with ID (Zoraida Thornton) for HIV and is compliant with Juluca. Follows with her every 3 weeks and has regular labs every 6 months.   Pt states had colonoscopy 1 year ago in . Pt has lost the report when he was moving homes.     Diabetes  He presents for his follow-up diabetic visit. He has type 2 diabetes mellitus. The initial diagnosis of diabetes was made 8 years ago. His disease course has been worsening. Pertinent negatives for  hypoglycemia include no confusion, dizziness, headaches, sweats or tremors. Pertinent negatives for diabetes include no chest pain, no foot paresthesias, no foot ulcerations, no polydipsia, no polyphagia and no polyuria. There are no hypoglycemic complications. Pertinent negatives for hypoglycemia complications include no blackouts. Symptoms are stable. Diabetic complications include nephropathy. Risk factors for coronary artery disease include diabetes mellitus and male sex. Current diabetic treatment includes oral agent (dual therapy). He is compliant with treatment most of the time. His weight is stable. When asked about meal planning, he reported none. He has not had a previous visit with a dietitian. He never participates in exercise. ACE inhibitor/angiotensin II receptor blocker: Ordered today. He does not see a podiatrist.Eye exam is not current.     Review of Systems   Constitutional:  Negative for unexpected weight change.   Respiratory:  Negative for shortness of breath.    Cardiovascular:  Negative for chest pain and palpitations.   Gastrointestinal:  Negative for abdominal pain, constipation, nausea and vomiting.   Endocrine: Negative for polydipsia, polyphagia and polyuria.   Neurological:  Negative for dizziness, tremors and headaches.   Psychiatric/Behavioral:  Negative for confusion.        Objective   /68   Pulse 104   Temp (!) 96.9 °F (36.1 °C)   Wt 77.2 kg (170 lb 3.2 oz)   SpO2 98%   BMI 27.89 kg/m²      Physical Exam  Vitals reviewed.   Constitutional:       General: He is not in acute distress.     Appearance: Normal appearance. He is well-developed. He is not ill-appearing.   HENT:      Head: Normocephalic.   Eyes:      General: No scleral icterus.     Extraocular Movements: Extraocular movements intact.   Cardiovascular:      Rate and Rhythm: Normal rate and regular rhythm.      Pulses: Normal pulses. no weak pulses.           Dorsalis pedis pulses are 2+ on the right side and 2+  on the left side.        Posterior tibial pulses are 2+ on the right side and 2+ on the left side.      Heart sounds: Normal heart sounds. No murmur heard.  Pulmonary:      Effort: Pulmonary effort is normal. No respiratory distress.      Breath sounds: Normal breath sounds. No wheezing.   Abdominal:      General: Bowel sounds are normal.      Palpations: Abdomen is soft.      Tenderness: There is no abdominal tenderness.   Musculoskeletal:      Right lower leg: No edema.      Left lower leg: No edema.   Feet:      Right foot:      Skin integrity: No ulcer, skin breakdown, erythema, warmth, callus or dry skin.      Left foot:      Skin integrity: No ulcer, skin breakdown, erythema, warmth, callus or dry skin.   Skin:     General: Skin is warm.      Capillary Refill: Capillary refill takes less than 2 seconds.   Neurological:      Mental Status: He is alert.      Gait: Gait normal.   Psychiatric:         Mood and Affect: Mood normal.       Diabetic Foot Exam    Patient's shoes and socks removed.    Right Foot/Ankle   Right Foot Inspection  Skin Exam: skin normal and skin intact. No dry skin, no warmth, no callus, no erythema, no maceration, no abnormal color, no pre-ulcer, no ulcer and no callus.     Toe Exam: ROM and strength within normal limits. No swelling, no tenderness, erythema and  no right toe deformity    Sensory   Vibration: intact  Proprioception: intact  Monofilament testing: intact    Vascular  The right DP pulse is 2+. The right PT pulse is 2+.     Left Foot/Ankle  Left Foot Inspection  Skin Exam: skin normal and skin intact. No dry skin, no warmth, no erythema, no maceration, normal color, no pre-ulcer, no ulcer and no callus.     Toe Exam: ROM and strength within normal limits. No swelling, no tenderness, no erythema and no left toe deformity.     Sensory   Vibration: intact  Proprioception: intact  Monofilament testing: intact    Vascular  The left DP pulse is 2+. The left PT pulse is 2+.     Assign  Risk Category  No deformity present  No loss of protective sensation  No weak pulses  Risk: 0

## 2025-01-06 LAB — COLOGUARD RESULT REPORTABLE: NEGATIVE

## 2025-01-08 ENCOUNTER — RESULTS FOLLOW-UP (OUTPATIENT)
Dept: FAMILY MEDICINE CLINIC | Facility: CLINIC | Age: 55
End: 2025-01-08

## 2025-01-09 NOTE — TELEPHONE ENCOUNTER
[id number]:  Called patient to relay test results below. Patient did not answer, left a voicemail to call the office at [phone].    ----- Message from Serafin Lane MD sent at 1/8/2025  5:28 PM EST -----  Hi ladies,    Please let pt know his cologuard is negative . Next one in 3 years, thanks.

## 2025-01-10 NOTE — TELEPHONE ENCOUNTER
Using  # [id number] called patient to relay test result from the Provider.     Patient did not answer. No answering machine. Called patient's spouse.     Relayed message verbatim as patient was with spouse.     He can contact the office with any further questions or concerns.    Thank you

## 2025-01-10 NOTE — TELEPHONE ENCOUNTER
----- Message from Serafin Lane MD sent at 1/8/2025  5:28 PM EST -----  Hi ladies,    Please let pt know his cologuard is negative . Next one in 3 years, thanks.

## 2025-02-14 DIAGNOSIS — E11.9 TYPE 2 DIABETES MELLITUS WITHOUT COMPLICATION, WITHOUT LONG-TERM CURRENT USE OF INSULIN (HCC): ICD-10-CM

## 2025-06-09 DIAGNOSIS — E11.9 TYPE 2 DIABETES MELLITUS WITHOUT COMPLICATION, WITHOUT LONG-TERM CURRENT USE OF INSULIN (HCC): Primary | ICD-10-CM

## 2025-06-09 DIAGNOSIS — E11.9 TYPE 2 DIABETES MELLITUS WITHOUT COMPLICATION, WITHOUT LONG-TERM CURRENT USE OF INSULIN (HCC): ICD-10-CM

## 2025-06-09 DIAGNOSIS — E78.2 MIXED HYPERLIPIDEMIA: ICD-10-CM

## 2025-06-09 RX ORDER — LISINOPRIL 5 MG/1
5 TABLET ORAL DAILY
Qty: 90 TABLET | Refills: 1 | Status: SHIPPED | OUTPATIENT
Start: 2025-06-09

## 2025-06-09 RX ORDER — ATORVASTATIN CALCIUM 20 MG/1
20 TABLET, FILM COATED ORAL DAILY
Qty: 30 TABLET | Refills: 0 | Status: SHIPPED | OUTPATIENT
Start: 2025-06-09

## 2025-06-09 RX ORDER — DAPAGLIFLOZIN 5 MG/1
5 TABLET, FILM COATED ORAL DAILY
Qty: 90 TABLET | Refills: 1 | Status: SHIPPED | OUTPATIENT
Start: 2025-06-09

## 2025-06-09 NOTE — TELEPHONE ENCOUNTER
Patient needs updated blood work. Please place orders for a lipid panel. A courtesy refill was provided.